# Patient Record
Sex: MALE | Race: BLACK OR AFRICAN AMERICAN | NOT HISPANIC OR LATINO | Employment: UNEMPLOYED | URBAN - METROPOLITAN AREA
[De-identification: names, ages, dates, MRNs, and addresses within clinical notes are randomized per-mention and may not be internally consistent; named-entity substitution may affect disease eponyms.]

---

## 2024-08-15 ENCOUNTER — ON-DEMAND VIRTUAL (OUTPATIENT)
Dept: URGENT CARE | Facility: CLINIC | Age: 34
End: 2024-08-15

## 2024-08-15 DIAGNOSIS — Z20.2 STD EXPOSURE: Primary | ICD-10-CM

## 2024-08-15 RX ORDER — METRONIDAZOLE 500 MG/1
TABLET ORAL
Qty: 4 TABLET | Refills: 0 | Status: SHIPPED | OUTPATIENT
Start: 2024-08-15

## 2024-08-15 NOTE — PROGRESS NOTES
Subjective:      Patient ID: Isabel Mc is a 34 y.o. male.    Vitals:  vitals were not taken for this visit.     Chief Complaint: Exposure to STD      Visit Type: TELE AUDIOVISUAL    Present with the patient at the time of consultation: TELEMED PRESENT WITH PATIENT: None, at work    Past Medical History:   Diagnosis Date    Hypertension      Past Surgical History:   Procedure Laterality Date    STOMACH FOREIGN BODY REMOVAL       Review of patient's allergies indicates:  No Known Allergies  No current outpatient medications on file prior to visit.     No current facility-administered medications on file prior to visit.     No family history on file.    Medications Ordered                [id number] - [place], [place] - [address] AT [facility]   [address], [facility] [address]    Telephone: [phone]   Fax: [phone]   Hours: Not open 24 hours                         E-Prescribed (1 of 1)              metroNIDAZOLE (FLAGYL) 500 MG tablet    Si gm as a single dose       Start: 8/15/24     Quantity: 4 tablet Refills: 0                           Ohs Peq Odvv Intake    8/15/2024 12:48 PM CDT - Filed by Patient   What is your current physical address in the event of a medical emergency? [address]   Are you able to take your vital signs? No   Please attach any relevant images or files          Partner diagnosed with trichomoniasis. No active symptoms. Seeking treatment advice.    Exposure to STD  The patient's pertinent negatives include no pelvic pain, penile discharge, penile pain, scrotal swelling or testicular pain. Pertinent negatives include no dysuria, frequency or urgency.       Genitourinary:  Negative for dysuria, frequency, urgency, hematuria, genital sore, penile discharge, painful ejaculation, penile pain, penile swelling, scrotal swelling, testicular pain and pelvic pain.        Objective:   The physical exam was  conducted virtually.  Physical Exam   Constitutional: He is oriented to person, place, and time. He does not appear ill. No distress.   HENT:   Head: Normocephalic and atraumatic.   Nose: Nose normal.   Eyes: Extraocular movement intact   Pulmonary/Chest: Effort normal.   Abdominal: Normal appearance.   Musculoskeletal: Normal range of motion.         General: Normal range of motion.   Neurological: no focal deficit. He is alert and oriented to person, place, and time.   Psychiatric: His behavior is normal. Mood normal.   Vitals reviewed.      Assessment:     1. STD exposure        Plan:     Follow-up for further testing as discussed.    Patient encouraged to monitor symptoms closely and instructed to follow-up for new or worsening symptoms. Further, in-person, evaluation may be necessary for continued treatment. Please follow up with your primary care doctor or specialist as needed. Verbally discussed plan. Patient confirms understanding and is in agreement with treatment and plan.     You must understand that you've received a Virtual Care evaluation only and that you may be released before all your medical problems are known or treated. You, the patient, will arrange for follow up care as instructed.    STD exposure  -     metroNIDAZOLE (FLAGYL) 500 MG tablet; 2 gm as a single dose  Dispense: 4 tablet; Refill: 0        Patient Instructions   Patient Education       Trichomoniasis Discharge Instructions   About this topic   Trichomoniasis is also called trich. It is an infection you can catch during sex. This means it is a sexually-transmitted disease (STD). It is caused by a germ. The infection easily passes from person to person during vaginal sex. During sex, trich most often infects the vagina or the urethra.  Many people with trich may not notice any signs. Some people may have an itch and irritation in the penis. They may feel a slight burning after passing urine and ejaculation and have a mild discharge.  Other people may have a frothy, smelly, yellow, green discharge from their vagina. They may also have pain in their lower belly or with sex and passing urine.  Trichomoniasis can be treated by antibiotics. It is important that you take the antibiotics the right way and finish your treatment. If it is not treated, trich may be passed on to sex partners. You can have trich more than one time.     What care is needed at home?   Ask your doctor what you need to do when you go home. Make sure you ask questions if you do not understand what the doctor says.  Tell your sex partner(s) or those whom you had sex with in the past 3 to 6 months to get tested. They may need treatment as well.  Avoid sharing sex toys. If you share toys, clean them and cover them with condom before use.  Heat may be used to help lower your belly pain. Put a heating pad on your belly for no more than 20 minutes at a time. Never go to sleep with a heating pad on as this can cause burns.  Change your underwear if it gets soaked by the discharge from your vagina or penis.  If you get your period while you have trich, use pads and change them right away when they get soaked. Stay away from tampons or menstrual cups.  What follow-up care is needed?   Your doctor may ask you to make visits to the office to check on your progress. Be sure to keep these visits.   Your doctor may want you to be tested again 1 to 3 months after treatment to make sure that the infection is fully gone.   What drugs may be needed?   Your doctor will order drugs to treat the infection. Be sure to take all your drugs as ordered. It is important that you take them the right way and finish your treatment even if you start feeling better, or the infection may not go away. Do not share your drugs with anyone.  Will physical activity be limited?   Physical activity may not be limited.   Do not have sex until you have finished the treatment and the doctor has told you it is safe to do  so.   What problems could happen?   Pain while having sex  Higher risk for getting HIV  You can get infected again  Low birth weight for a baby if you have the infection while pregnant  Trouble getting pregnant  What can be done to prevent this health problem?   The only guaranteed way to keep from getting or passing on a sexually-transmitted infection is to not have sex with anyone. This infection may be spread even if you do not have any signs of illness.  Avoid contact with any sex partner known to have the infection.  If you have sex, use latex condoms to lower spread of infection.  If you are pregnant, get tested and get prompt treatment for trichomoniasis infection. This will help avoid passing it to your baby.  Avoid having many sex partners.  Get a regular check-up for STDs.  When do I need to call the doctor?   Signs of infection. These include a fever of 100.4°F (38°C) or higher, chills.  Penis or vaginal discharge is seen  Soreness or bleeding from your genitals  Pain during sex  Teach Back: Helping You Understand   The Teach Back Method helps you understand the information we are giving you. After you talk with the staff, tell them in your own words what you learned. This helps to make sure the staff has described each thing clearly. It also helps to explain things that may have been confusing. Before going home, make sure you can do these:  I can tell you about my condition.  I can tell you what I can do to help avoid passing the infection to others.  I can tell you what I will do if I have discharge from my penis or vagina, soreness or bleeding in my genitals, or pain during sex.  Where can I learn more?   Centers for Disease Control and Prevention  http://www.cdc.gov/std/trichomonas/default.htm   KidsHealth  http://kidshealth.org/parent/infections/std/trichomoniasis.html   Office on Women's Health, U.S. Department of Health and Human Services  https://www.womenshealth.gov/a-z-topics/trichomoniasis    Planned Parenthood  https://www.plannedparenthood.org/learn/fmoe-nso-guwer-sex/trichomoniasis   Last Reviewed Date   2021-09-10  Consumer Information Use and Disclaimer   This information is not specific medical advice and does not replace information you receive from your health care provider. This is only a brief summary of general information. It does NOT include all information about conditions, illnesses, injuries, tests, procedures, treatments, therapies, discharge instructions or life-style choices that may apply to you. You must talk with your health care provider for complete information about your health and treatment options. This information should not be used to decide whether or not to accept your health care providers advice, instructions or recommendations. Only your health care provider has the knowledge and training to provide advice that is right for you.  Copyright   Copyright © 2021 [employer] and its affiliates and/or licensors. All rights reserved.

## 2024-08-15 NOTE — PATIENT INSTRUCTIONS
Patient Education       Trichomoniasis Discharge Instructions   About this topic   Trichomoniasis is also called trich. It is an infection you can catch during sex. This means it is a sexually-transmitted disease (STD). It is caused by a germ. The infection easily passes from person to person during vaginal sex. During sex, trich most often infects the vagina or the urethra.  Many people with trich may not notice any signs. Some people may have an itch and irritation in the penis. They may feel a slight burning after passing urine and ejaculation and have a mild discharge. Other people may have a frothy, smelly, yellow, green discharge from their vagina. They may also have pain in their lower belly or with sex and passing urine.  Trichomoniasis can be treated by antibiotics. It is important that you take the antibiotics the right way and finish your treatment. If it is not treated, trich may be passed on to sex partners. You can have trich more than one time.     What care is needed at home?   Ask your doctor what you need to do when you go home. Make sure you ask questions if you do not understand what the doctor says.  Tell your sex partner(s) or those whom you had sex with in the past 3 to 6 months to get tested. They may need treatment as well.  Avoid sharing sex toys. If you share toys, clean them and cover them with condom before use.  Heat may be used to help lower your belly pain. Put a heating pad on your belly for no more than 20 minutes at a time. Never go to sleep with a heating pad on as this can cause burns.  Change your underwear if it gets soaked by the discharge from your vagina or penis.  If you get your period while you have trich, use pads and change them right away when they get soaked. Stay away from tampons or menstrual cups.  What follow-up care is needed?   Your doctor may ask you to make visits to the office to check on your progress. Be sure to keep these visits.   Your doctor may want you  to be tested again 1 to 3 months after treatment to make sure that the infection is fully gone.   What drugs may be needed?   Your doctor will order drugs to treat the infection. Be sure to take all your drugs as ordered. It is important that you take them the right way and finish your treatment even if you start feeling better, or the infection may not go away. Do not share your drugs with anyone.  Will physical activity be limited?   Physical activity may not be limited.   Do not have sex until you have finished the treatment and the doctor has told you it is safe to do so.   What problems could happen?   Pain while having sex  Higher risk for getting HIV  You can get infected again  Low birth weight for a baby if you have the infection while pregnant  Trouble getting pregnant  What can be done to prevent this health problem?   The only guaranteed way to keep from getting or passing on a sexually-transmitted infection is to not have sex with anyone. This infection may be spread even if you do not have any signs of illness.  Avoid contact with any sex partner known to have the infection.  If you have sex, use latex condoms to lower spread of infection.  If you are pregnant, get tested and get prompt treatment for trichomoniasis infection. This will help avoid passing it to your baby.  Avoid having many sex partners.  Get a regular check-up for STDs.  When do I need to call the doctor?   Signs of infection. These include a fever of 100.4°F (38°C) or higher, chills.  Penis or vaginal discharge is seen  Soreness or bleeding from your genitals  Pain during sex  Teach Back: Helping You Understand   The Teach Back Method helps you understand the information we are giving you. After you talk with the staff, tell them in your own words what you learned. This helps to make sure the staff has described each thing clearly. It also helps to explain things that may have been confusing. Before going home, make sure you can do  these:  I can tell you about my condition.  I can tell you what I can do to help avoid passing the infection to others.  I can tell you what I will do if I have discharge from my penis or vagina, soreness or bleeding in my genitals, or pain during sex.  Where can I learn more?   Centers for Disease Control and Prevention  http://www.cdc.gov/std/trichomonas/default.htm   KidsHealth  http://kidshealth.org/parent/infections/std/trichomoniasis.html   Office on Women's Health, U.S. Department of Health and Human Services  https://www.womenshealth.gov/a-z-topics/trichomoniasis   Planned Parenthood  https://www.plannedparenthood.org/learn/zaak-lts-vmcmq-sex/trichomoniasis   Last Reviewed Date   2021-09-10  Consumer Information Use and Disclaimer   This information is not specific medical advice and does not replace information you receive from your health care provider. This is only a brief summary of general information. It does NOT include all information about conditions, illnesses, injuries, tests, procedures, treatments, therapies, discharge instructions or life-style choices that may apply to you. You must talk with your health care provider for complete information about your health and treatment options. This information should not be used to decide whether or not to accept your health care providers advice, instructions or recommendations. Only your health care provider has the knowledge and training to provide advice that is right for you.  Copyright   Copyright © 2021 [employer] and its affiliates and/or licensors. All rights reserved.

## 2025-09-03 ENCOUNTER — ON-DEMAND VIRTUAL (OUTPATIENT)
Dept: URGENT CARE | Facility: CLINIC | Age: 35
End: 2025-09-03
Payer: MEDICARE

## 2025-09-03 DIAGNOSIS — B35.4 TINEA CORPORIS: Primary | ICD-10-CM

## 2025-09-03 PROCEDURE — 98005 SYNCH AUDIO-VIDEO EST LOW 20: CPT | Mod: 95,,, | Performed by: NURSE PRACTITIONER

## 2025-09-03 RX ORDER — KETOCONAZOLE 20 MG/G
CREAM TOPICAL 2 TIMES DAILY
Qty: 15 G | Refills: 1 | Status: SHIPPED | OUTPATIENT
Start: 2025-09-03